# Patient Record
Sex: FEMALE | Race: ASIAN | NOT HISPANIC OR LATINO
[De-identification: names, ages, dates, MRNs, and addresses within clinical notes are randomized per-mention and may not be internally consistent; named-entity substitution may affect disease eponyms.]

---

## 2024-03-27 ENCOUNTER — APPOINTMENT (OUTPATIENT)
Dept: HEPATOLOGY | Facility: CLINIC | Age: 59
End: 2024-03-27
Payer: MEDICAID

## 2024-03-27 VITALS
OXYGEN SATURATION: 97 % | HEIGHT: 63.78 IN | TEMPERATURE: 97.7 F | RESPIRATION RATE: 18 BRPM | WEIGHT: 160.5 LBS | DIASTOLIC BLOOD PRESSURE: 92 MMHG | HEART RATE: 89 BPM | SYSTOLIC BLOOD PRESSURE: 144 MMHG | BODY MASS INDEX: 27.74 KG/M2

## 2024-03-27 PROBLEM — Z00.00 ENCOUNTER FOR PREVENTIVE HEALTH EXAMINATION: Status: ACTIVE | Noted: 2024-03-27

## 2024-03-27 PROCEDURE — 99204 OFFICE O/P NEW MOD 45 MIN: CPT

## 2024-03-31 NOTE — ASSESSMENT
[FreeTextEntry1] : Candice Merida is a 59y female who is referred by PCP for LFT elevation. 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 others - lungs, stomach, ovaries   - Education and counseling were provided to the patient. - Discussed at length with the patient that next course of action would depend on results  check AFP, AFP L3%, DCP f/u with gyn refer to GI for EGD Chest X-ray in 2 months RTO in 2 months

## 2024-03-31 NOTE — HISTORY OF PRESENT ILLNESS
[FreeTextEntry1] : RFR: AFP elevation Referring from: PCP Dr. Giovani Holden  Candice Merida is a 59y female who is referred by PCP for LFT elevation. Last EGD never Last GYN check-up - last year No secondary smoking cigs Asymptomatic   [Medical Hx] HTN med due to right kidney GFR <60 Cr 1.19 [Surgical Hx] kidney donor to brother in  [Social Hx] Alcohol: Denies Tobacco: Never illicit drug:  Denies Herb and dietary Supplement: Vit C D E, Mg, Omega 3 [FMH of liver disease] No cirrhosis, hepatitis, HCC  [Labs] 24 AFP 6.7   Cr 1.19 GFR 53  Na 140 K 4.3  Hb 14.1 MCV 90.5 TB 0.7 AST/ALT  ALC 5.2 LDL 62  TSH 4.05  10/27/23 AST/ALT 29/46  23 AST/ALT   [Imaging] US abd 2024 at Dr. Holden's office: was told fatty liver  [Procedures] EGD: No prior Colonoscopy: No prior Colegurd negative   03-May-2022 08:09

## 2024-03-31 NOTE — PHYSICAL EXAM
[Scleral Icterus] : No Scleral Icterus [Spider Angioma] : No spider angioma(s) were observed [Abdominal Bruit] : no abdominal bruit [Jaundice] : No jaundice

## 2024-03-31 NOTE — REVIEW OF SYSTEMS
[Fever] : no fever [Chills] : no chills [Feeling Tired] : not feeling tired [Eye Pain] : no eye pain [Chest Pain] : no chest pain [Earache] : no earache [Palpitations] : no palpitations [Cough] : no cough [Shortness Of Breath] : no shortness of breath [Abdominal Pain] : no abdominal pain [Vomiting] : no vomiting [Diarrhea] : no diarrhea [Constipation] : no constipation [Limb Swelling] : no limb swelling [Melena] : no melena [Itching] : no itching [Dizziness] : no dizziness [Fainting] : no fainting [Anxiety] : no anxiety [Easy Bleeding] : no tendency for easy bleeding

## 2024-04-11 ENCOUNTER — APPOINTMENT (OUTPATIENT)
Dept: RADIOLOGY | Facility: CLINIC | Age: 59
End: 2024-04-11
Payer: MEDICAID

## 2024-04-11 ENCOUNTER — OUTPATIENT (OUTPATIENT)
Dept: OUTPATIENT SERVICES | Facility: HOSPITAL | Age: 59
LOS: 1 days | End: 2024-04-11
Payer: MEDICAID

## 2024-04-11 DIAGNOSIS — R77.2 ABNORMALITY OF ALPHAFETOPROTEIN: ICD-10-CM

## 2024-04-11 DIAGNOSIS — R74.8 ABNORMAL LEVELS OF OTHER SERUM ENZYMES: ICD-10-CM

## 2024-04-11 PROCEDURE — 71046 X-RAY EXAM CHEST 2 VIEWS: CPT

## 2024-04-11 PROCEDURE — 71046 X-RAY EXAM CHEST 2 VIEWS: CPT | Mod: 26

## 2024-04-16 ENCOUNTER — APPOINTMENT (OUTPATIENT)
Dept: HEPATOLOGY | Facility: CLINIC | Age: 59
End: 2024-04-16
Payer: MEDICAID

## 2024-04-16 PROCEDURE — 76981 USE PARENCHYMA: CPT

## 2024-04-23 LAB
ACARBOXYPROTHROMBIN SERPL-MCNC: 0.2 NG/ML
AFP-TM SERPL-MCNC: 7.5 NG/ML
ALBUMIN SERPL ELPH-MCNC: 4.8 G/DL
ALP BLD-CCNC: 68 U/L
ALPHA-1-FETOPROTEIN-L3: NORMAL %
ALPHA-1-FETOPROTEIN: 3.1 NG/ML
ALT SERPL-CCNC: 35 U/L
ANION GAP SERPL CALC-SCNC: 13 MMOL/L
AST SERPL-CCNC: 22 U/L
BASOPHILS # BLD AUTO: 0.03 K/UL
BASOPHILS NFR BLD AUTO: 0.5 %
BILIRUB SERPL-MCNC: 0.5 MG/DL
BUN SERPL-MCNC: 21 MG/DL
CALCIUM SERPL-MCNC: 9.7 MG/DL
CHLORIDE SERPL-SCNC: 103 MMOL/L
CO2 SERPL-SCNC: 25 MMOL/L
CREAT SERPL-MCNC: 1.15 MG/DL
EGFR: 55 ML/MIN/1.73M2
EOSINOPHIL # BLD AUTO: 0.13 K/UL
EOSINOPHIL NFR BLD AUTO: 2.3 %
GGT SERPL-CCNC: 42 U/L
GLUCOSE SERPL-MCNC: 98 MG/DL
HBV CORE IGG+IGM SER QL: NONREACTIVE
HBV SURFACE AB SERPL IA-ACNC: <3 MIU/ML
HBV SURFACE AG SER QL: NONREACTIVE
HCT VFR BLD CALC: 39.5 %
HCV AB SER QL: NONREACTIVE
HCV S/CO RATIO: 0.17 S/CO
HEPATITIS A IGG ANTIBODY: REACTIVE
HGB BLD-MCNC: 13.3 G/DL
IMM GRANULOCYTES NFR BLD AUTO: 0.3 %
LYMPHOCYTES # BLD AUTO: 1.88 K/UL
LYMPHOCYTES NFR BLD AUTO: 32.9 %
MAN DIFF?: NORMAL
MCHC RBC-ENTMCNC: 30.4 PG
MCHC RBC-ENTMCNC: 33.7 GM/DL
MCV RBC AUTO: 90.4 FL
MONOCYTES # BLD AUTO: 0.3 K/UL
MONOCYTES NFR BLD AUTO: 5.2 %
NEUTROPHILS # BLD AUTO: 3.36 K/UL
NEUTROPHILS NFR BLD AUTO: 58.8 %
PLATELET # BLD AUTO: 228 K/UL
POTASSIUM SERPL-SCNC: 4.5 MMOL/L
PROT SERPL-MCNC: 7.6 G/DL
RBC # BLD: 4.37 M/UL
RBC # FLD: 12.4 %
SODIUM SERPL-SCNC: 141 MMOL/L
WBC # FLD AUTO: 5.72 K/UL

## 2024-05-29 ENCOUNTER — APPOINTMENT (OUTPATIENT)
Dept: HEPATOLOGY | Facility: CLINIC | Age: 59
End: 2024-05-29
Payer: MEDICAID

## 2024-05-29 VITALS
SYSTOLIC BLOOD PRESSURE: 117 MMHG | BODY MASS INDEX: 27.14 KG/M2 | HEART RATE: 76 BPM | OXYGEN SATURATION: 98 % | DIASTOLIC BLOOD PRESSURE: 80 MMHG | WEIGHT: 157 LBS

## 2024-05-29 DIAGNOSIS — R74.8 ABNORMAL LEVELS OF OTHER SERUM ENZYMES: ICD-10-CM

## 2024-05-29 DIAGNOSIS — R77.2 ABNORMALITY OF ALPHAFETOPROTEIN: ICD-10-CM

## 2024-05-29 PROCEDURE — 99214 OFFICE O/P EST MOD 30 MIN: CPT

## 2024-06-02 NOTE — HISTORY OF PRESENT ILLNESS
[FreeTextEntry1] :  Candice Merida is a 59y female who was referred by PCP for AFP elevation. Here for the 1st follow-up.   - The last GYN check-up was completed last year. There is no exposure to secondary cigarette smoke. The patient is currently asymptomatic. - Fibroscan 24 3.2kPa cap 225 F0 S0 - EGD done in May 2024: wnl per pt - AFP, AFP L3%, DCP are wnl - Chest  X-ray wnl  [Medical Hx] Pt does not have HTN but on HTN med due to right kidney GFR <60 Cr 1.19  [Surgical Hx] kidney donor to brother in  [Social Hx] Alcohol: Denies Tobacco: Never illicit drug: Denies Herb and dietary Supplement: Vit C D E, Mg, Omega 3 [FMH of liver disease] No cirrhosis, hepatitis, HCC  [Labs] 24 AFP 6.7  Cr 1.19 GFR 53 Na 140 K 4.3 Hb 14.1 MCV 90.5 TB 0.7 AST/ALT  ALC 5.2 LDL 62  TSH 4.05  10/27/23 AST/ALT 29/46  23 AST/ALT   [Imaging] US abd 2024 at Dr. Holden's office: was told fatty liver  [Procedures] EGD: No prior Colonoscopy: No prior Colegurd negative

## 2024-06-02 NOTE — REVIEW OF SYSTEMS
[Fever] : no fever [Chills] : no chills [Feeling Tired] : not feeling tired [Eye Pain] : no eye pain [Earache] : no earache [Chest Pain] : no chest pain [Palpitations] : no palpitations [Shortness Of Breath] : no shortness of breath [Cough] : no cough [Abdominal Pain] : no abdominal pain [Vomiting] : no vomiting [Constipation] : no constipation [Diarrhea] : no diarrhea [Melena] : no melena [Limb Swelling] : no limb swelling [Itching] : no itching [Dizziness] : no dizziness [Fainting] : no fainting [Anxiety] : no anxiety [Easy Bleeding] : no tendency for easy bleeding

## 2024-06-02 NOTE — ASSESSMENT
[FreeTextEntry1] : __  [A/P]  59y female  # elevated liver # - suspect mild MASLD # elevated AFP # - Will continue to monitor - Education and counseling were provided to the patient.  - Called pt for result discussion. left voice mail - LFT ok AFP 7.5 wnl (same upper limit of normal limit of [facility] reference range) - chest X-ray wnl - EGD done wnl per pt - Fibroscan 4/16/24 3.2kPa cap 225 F0 S0  Labs in a year fibroscan in a year US abd in a year RTO in a year

## 2025-05-28 ENCOUNTER — APPOINTMENT (OUTPATIENT)
Dept: HEPATOLOGY | Facility: CLINIC | Age: 60
End: 2025-05-28